# Patient Record
Sex: FEMALE | Race: BLACK OR AFRICAN AMERICAN
[De-identification: names, ages, dates, MRNs, and addresses within clinical notes are randomized per-mention and may not be internally consistent; named-entity substitution may affect disease eponyms.]

---

## 2017-02-02 ENCOUNTER — HOSPITAL ENCOUNTER (OUTPATIENT)
Dept: HOSPITAL 62 - LAB | Age: 22
End: 2017-02-02
Attending: NURSE PRACTITIONER
Payer: COMMERCIAL

## 2017-02-02 DIAGNOSIS — N89.8: Primary | ICD-10-CM

## 2017-02-02 LAB — CHLAM PCR: NOT DETECTED

## 2017-02-02 PROCEDURE — 87210 SMEAR WET MOUNT SALINE/INK: CPT

## 2017-02-02 PROCEDURE — 87491 CHLMYD TRACH DNA AMP PROBE: CPT

## 2017-02-02 PROCEDURE — 87591 N.GONORRHOEAE DNA AMP PROB: CPT

## 2017-10-09 ENCOUNTER — HOSPITAL ENCOUNTER (EMERGENCY)
Dept: HOSPITAL 62 - ER | Age: 22
Discharge: HOME | End: 2017-10-09
Payer: COMMERCIAL

## 2017-10-09 VITALS — SYSTOLIC BLOOD PRESSURE: 136 MMHG | DIASTOLIC BLOOD PRESSURE: 89 MMHG
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
## 2017-10-09 DIAGNOSIS — S89.91XA: Primary | ICD-10-CM

## 2017-10-09 DIAGNOSIS — M25.561: ICD-10-CM

## 2017-10-09 PROCEDURE — 99283 EMERGENCY DEPT VISIT LOW MDM: CPT

## 2017-10-09 NOTE — RADIOLOGY REPORT (SQ)
EXAM DESCRIPTION:  KNEE LEFT 3 VIEWS



COMPLETED DATE/TIME:  10/9/2017 4:50 pm



REASON FOR STUDY:  knee pain from fall



COMPARISON:  None.



NUMBER OF VIEWS:  Three views.



TECHNIQUE:  AP, lateral, and sunrise patella radiographic images acquired of the left knee.



LIMITATIONS:  None.



FINDINGS:  MINERALIZATION: Normal.

BONES: No dislocation.  Mild undulation in the medial femoral condylar articular surface without scle
rosis, probable osteochondral injury.

JOINT: No effusion.

SOFT TISSUES: No soft tissue swelling.  No radio-opaque foreign body.

OTHER: No other significant finding.



IMPRESSION:  Moderate joint effusion.   Mild undulation in the medial femoral condylar articular surf
ace without sclerosis, probable osteochondral injury.



TECHNICAL DOCUMENTATION:  JOB ID:  [id number]

 2011 [employer]- All Rights Reserved

## 2017-10-09 NOTE — ER DOCUMENT REPORT
HPI





- HPI


Patient complains to provider of: right knee pain


Pain Level: 4


Context: 





Patient is a 22-year-old female who states that her left knee gave out while 

she was getting groceries out of the car today.  She states she has pain on it 

whenever she tries to stand on it.  Otherwise she states she is able to bend it 

and straighten it when she is laying flat.  She has not taken anything for pain 

prior.





- DERM


Skin Color: Normal, Pink





Past Medical History





- Social History


Smoking Status: Unknown if Ever Smoked


Family History: Reviewed & Not Pertinent


Patient has suicidal ideation: No


Patient has homicidal ideation: No


Renal/ Medical History: Denies: Hx Peritoneal Dialysis





Vertical Provider Document





- CONSTITUTIONAL


Agree With Documented VS: Yes


Exam Limitations: No Limitations


General Appearance: WD/WN, No Apparent Distress


Notes: 





PHYSICAL EXAM


GENERAL: Alert, interacts well. 


HEAD: Normocephalic, atraumatic.


EXTREMITIES: Left knee with negative drawer testing, valgus/varus. no joint 

line tenderness. moves all 4 extremities spontaneously. No edema, radial and 

dorsalis pedis pulses 2/4 bilaterally. No cyanosis. 


NEUROLOGICAL: Alert and oriented x4. Normal speech.


PSYCH: Normal affect, normal mood.


SKIN: Warm, dry, normal turgor. No rashes or lesions noted.








- INFECTION CONTROL


TRAVEL OUTSIDE OF THE [place] IN LAST 30 DAYS: No





- RESPIRATORY


O2 Sat by Pulse Oximetry: 99





Course





- Re-evaluation


Re-evalutation: 





10/09/17 17:56


Patient is a 22-year-old female hemodynamically stable, no acute distress 

afebrile no evidence of a septic joint, gout flare, dislocation, or fracture on 

exam and imaging.  There is evidence of moderate joint effusion but patient 

declining joint aspiration at this time and bending and flexing the knee 

without any difficulty or pain when not standing on it.  Vitals wnl. At this 

time, I do not see an indication for labs or further imaging. Will discharge 

with conservative measures, return precautions, and follow-up recommendations.





- Vital Signs


Vital signs: 


 











Temp Pulse Resp BP Pulse Ox


 


 98.2 F   111 H  18   151/91 H  99 


 


 10/09/17 15:37  10/09/17 15:37  10/09/17 15:37  10/09/17 15:37  10/09/17 15:37














Discharge





- Discharge


Clinical Impression: 


 Knee injury





Condition: Good


Disposition: HOME, SELF-CARE


Instructions:  Use of Crutches ([facility]), Ice & Elevation ([facility]), Sprained Knee ([facility])

, Suspected Internal Knee Injury ([facility])


Additional Instructions: 


If symptoms do not improve in approximately 10 days please follow-up with 

primary care doctor listed below.


Prescriptions: 


Ibuprofen [Motrin 800 mg Tablet] 800 mg PO Q8H PRN #30 tab


 PRN Reason: 


Forms:  Special Work Note, Elevated Blood Pressure


Referrals: 


ISIDRA MEJIA MD [COMMUNITY BASED STAFF] - Follow up as needed

## 2017-10-19 ENCOUNTER — HOSPITAL ENCOUNTER (OUTPATIENT)
Dept: HOSPITAL 62 - RAD | Age: 22
End: 2017-10-19
Attending: FAMILY MEDICINE
Payer: COMMERCIAL
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
## 2017-10-19 DIAGNOSIS — S83.512A: Primary | ICD-10-CM
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
## 2017-10-20 NOTE — RADIOLOGY REPORT (SQ)
EXAM DESCRIPTION:  MRI LT LOWER JOINT WITHOUT



COMPLETED DATE/TIME:  10/19/2017 5:39 pm



REASON FOR STUDY:  SPRAIN OF ANTERIOR CRUCIATE LIGAMENT OF LEFT KNEE, INITIAL ENCOUNTER S43.52XA  SPR
AIN OF LEFT ACROMIOCLAVICULAR JOINT, INITIAL ENC



COMPARISON:  Radiographs 10/9/2017.



TECHNIQUE:  Leftknee images acquired and stored on PACS.  Multiplanar images include fat sensitive se
quences as T1, water sensitive sequences as FST2 or STIR, cartilage sensitive sequences as FSPD, and 
gradient echo sequences.



LIMITATIONS:  None.



FINDINGS:  JOINT AND BURSAE: Moderate to large joint effusion.  Potential dependent debris in the asa
nt.

BONE CORTEX AND MARROW: Contusion tracking to subchondral bone in the lateral femoral condyle, mild i
mpaction deformity here.  Contusion also tracks to the posterior aspect of the lateral tibial plateau
.

ACL: Completely disrupted.  Minimal proximal and distal fibers seen, but the majority of the region o
f the ligament is replaced by edema.

PCL: Intact.

MCL: Ligament intact but regional superficial edema.  Likely grade 1 sprain.

LCL: Intact.

MEDIAL MENISCUS: No tears. No abnormal signal.

LATERAL MENISCUS: No tears. No abnormal signal.

MEDIAL COMPARTMENT: Cartilage preserved. No bone bruises or reactive marrow edema. No osteophytes.

LATERAL COMPARTMENT: As above.  No discrete chondral defects.

PATELLA: No chondromalacia. No subchondral cysts. Medial and lateral retinacula intact.

EXTENSOR MECHANISM: Generally intact patellar and quadriceps tendons.  Minimal distal patellar tendin
osis with some associated enthesopathy suggested.

SOFT TISSUES: Adjacent muscles and subcutaneous tissues normal.  Normal flow void in popliteal artery
 and vein.

OTHER: No other significant finding.



IMPRESSION:  1. Complete ACL disruption with associated lateral compartment contusions and mild impac
tion fracture in the lateral femoral condyle.  2. Low-grade MCL sprain.  3.  PCL, lateral collateral 
ligament complex and menisci look intact.



TECHNICAL DOCUMENTATION:  JOB ID:  [id number]

 2011 [employer]- All Rights Reserved

## 2017-10-26 ENCOUNTER — HOSPITAL ENCOUNTER (EMERGENCY)
Dept: HOSPITAL 62 - ER | Age: 22
Discharge: HOME | End: 2017-10-26
Payer: COMMERCIAL

## 2017-10-26 VITALS — DIASTOLIC BLOOD PRESSURE: 91 MMHG | SYSTOLIC BLOOD PRESSURE: 139 MMHG

## 2017-10-26 DIAGNOSIS — M79.1: ICD-10-CM

## 2017-10-26 DIAGNOSIS — Z88.0: ICD-10-CM

## 2017-10-26 DIAGNOSIS — M79.605: Primary | ICD-10-CM

## 2017-10-26 DIAGNOSIS — M25.569: ICD-10-CM

## 2017-10-26 DIAGNOSIS — M79.602: ICD-10-CM

## 2017-10-26 DIAGNOSIS — M53.3: ICD-10-CM
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
## 2017-10-26 PROCEDURE — 72220 X-RAY EXAM SACRUM TAILBONE: CPT

## 2017-10-26 PROCEDURE — 99284 EMERGENCY DEPT VISIT MOD MDM: CPT
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
## 2017-11-15 ENCOUNTER — HOSPITAL ENCOUNTER (EMERGENCY)
Dept: HOSPITAL 62 - ER | Age: 22
Discharge: HOME | End: 2017-11-15
Payer: COMMERCIAL

## 2017-11-15 VITALS — SYSTOLIC BLOOD PRESSURE: 143 MMHG | DIASTOLIC BLOOD PRESSURE: 84 MMHG
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
## 2017-11-15 DIAGNOSIS — M25.562: ICD-10-CM

## 2017-11-15 DIAGNOSIS — R03.0: ICD-10-CM

## 2017-11-15 DIAGNOSIS — S83.92XA: Primary | ICD-10-CM

## 2017-11-15 PROCEDURE — 99283 EMERGENCY DEPT VISIT LOW MDM: CPT
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
## 2018-07-22 NOTE — ER DOCUMENT REPORT
ED General





- General


Chief Complaint: Back Pain


Stated Complaint: BACK PAIN


Time Seen by Provider: 07/22/18 17:26


Mode of Arrival: Ambulatory


Information source: Patient


Notes: 





Patient is an otherwise healthy 23-year-old female who presents with low back 

pain.  Patient reports that on Friday she was at work and lifted a patient, 

patient reports the next day she started having low back pain.  Patient denies 

any radiation of the pain, denies any numbness or tingling.  Patient further 

denies any urinary symptoms or fever.


TRAVEL OUTSIDE OF THE [place] IN LAST 30 DAYS: No





- Related Data


Allergies/Adverse Reactions: 


 





Penicillins Allergy (Verified 11/15/17 17:11)


 











Past Medical History





- General


Information source: Patient





- Social History


Smoking Status: Never Smoker


Chew tobacco use (# tins/day): No


Drug Abuse: None


Lives with: Family


Family History: Reviewed & Not Pertinent


Patient has suicidal ideation: No


Patient has homicidal ideation: No





- Medical History


Medical History: Negative


Renal/ Medical History: Denies: Hx Peritoneal Dialysis


Surgical Hx: Negative





- Immunizations


Immunizations up to date: Yes





Review of Systems





- Review of Systems


Constitutional: No symptoms reported


EENT: No symptoms reported


Cardiovascular: No symptoms reported


Respiratory: No symptoms reported


Gastrointestinal: No symptoms reported


Genitourinary: No symptoms reported


Female Genitourinary: No symptoms reported


Musculoskeletal: See HPI


Skin: No symptoms reported


Hematologic/Lymphatic: No symptoms reported


Neurological/Psychological: No symptoms reported





Physical Exam





- Vital signs


Vitals: 


 











Temp Pulse Resp BP Pulse Ox


 


 99.5 F   98   18   124/81   98 


 


 07/22/18 16:23  07/22/18 16:23  07/22/18 16:23  07/22/18 16:23  07/22/18 16:23














- Notes


Notes: 





PHYSICAL EXAMINATION:





GENERAL: Well-appearing, well-nourished and in no acute distress.





HEAD: Atraumatic, normocephalic.





EYES: Pupils equal round and reactive to light, extraocular movements intact, 

conjunctiva are normal.





ENT: Nares patent, oropharynx clear without exudates.  Moist mucous membranes.





NECK: Normal range of motion, supple without lymphadenopathy





LUNGS: Breath sounds clear to auscultation bilaterally and equal.  No wheezes 

rales or rhonchi.





HEART: Regular rate and rhythm without murmurs





ABDOMEN: Soft, nontender, nondistended abdomen.  No guarding, no rebound.  No 

masses appreciated.





Female : deferred





Musculoskeletal: Normal range of motion, no pitting or edema.  No cyanosis.  

Tenderness to palpation to bilateral paraspinous muscles.  No tenderness on 

palpation over the lumbar spine.





NEUROLOGICAL: Cranial nerves grossly intact.  Normal speech, normal gait.  

Normal sensory, motor exams





PSYCH: Normal mood, normal affect.





SKIN: Warm, Dry, normal turgor, no rashes or lesions noted.





Course





- Re-evaluation


Re-evalutation: 


Patient's complaint of low back pain and events surrounding it are consistent 

with musculoskeletal strain.  Will place patient on muscle relaxer and pain 

medication and reevaluate.








Patient reports reduction of pain after administration of IM Toradol and p.o. 

Flexeril.  Patient will be discharged home with instructions for managing a 

lumbar strain.  Patient will be encouraged to follow-up with her primary care 

provider as well as Workmen's Comp. provider for further direction.





- Vital Signs


Vital signs: 


 











Temp Pulse Resp BP Pulse Ox


 


 99 F   88   16   132/82 H  99 


 


 07/22/18 18:40  07/22/18 18:40  07/22/18 18:40  07/22/18 18:40  07/22/18 18:40














Discharge





- Discharge


Clinical Impression: 


 Musculoskeletal strain





Condition: Stable


Disposition: HOME, SELF-CARE


Additional Instructions: 


Muscle Strain





     You have strained a muscle -- torn the fibers within the muscle. This 

often occurs with strenuous exertion, or during an injury that suddenly 

stretches the muscle.  The seriousness of a strain varies. Some strains heal 

within days, others cause problems for months.


     X-rays cannot show a muscle strain.  X-rays are taken only if symptoms 

suggest that a fracture could be present.


     The usual treatment of a muscle strain is rest and ice packs. Sometimes, a 

sling, splint, or crutches may be necessary to rest the muscle.  The muscle can 

be used again once pain subsides.  Severe strains require a special exercise 

and stretching program to prevent permanent stiffness and disability.  Your 

doctor will advise you if this will be necessary.


     Call the doctor immediately if pain or swelling becomes severe, or if 

numbness or discoloration develop.





LOW BACK PAIN:





     Three out of every four people will have an episode of disabling back pain 

during their lifetime.  Most commonly the pain is due to straining of the 

muscles and ligaments in the low back.


     Usual treatment includes: 


(1) Rest on a firm surface.  Avoid lying on your stomach.  


(2) Ice pack the painful area.  After a few days, gentle heat may be used 

intermittently to relax the area, or ice packs can be continued.  


(3) Medication may be needed -- muscle relaxers and antiinflammatory medicines 

are commonly used.  


(4) As the back improves, exercises are prescribed to strengthen the back and 

abdominal muscles.


     Your doctor will advise you on the proper care for your back at each stage 

in your recovery.  You may be better in a few days -- or healing may take 

several weeks.


     If new symptoms of a "herniated disc" (radiation of pain, numbness, or 

tingling down the back of the leg or weakness in the leg) occur, you should be 

re-examined.  Further testing may be necessary.





MUSCLE RELAXERS: 


     Muscle relaxing medications are usually prescribed for acute muscle spasm 

or injury to the neck and back.  They are often combined with antiinflammatory 

pain medication for increased relief.


     You may stop the muscle relaxer when the pain and stiffness have improved.

  Start the medication again if spasms recur.  


     Muscle relaxers may cause drowsiness, especially with the first dose.  Do 

not operate machinery or drive while under the effects of the medication.  Most 

muscle relaxers last up to 24 hours.  Do not combine the medication with 

alcohol.








ICE PACKS:


     Apply ice packs frequently against the painful area.  Many different 

schedules are recommended, such as "20 minutes on, 20 minutes off" or "one hour 

ice, two hours rest."  If you need to work, you may need to go longer between 

ice treatments.  You should plan to have the area ice packed AT LEAST one 

fourth of the time.


     The ice should be applied over the wrap, tape, or splint, or over a layer 

of cloth -- not directly against the skin.  Some ice bags have a built-in cloth 

and can be put directly on the skin.





WARM PACKS:


     After approximately two days, apply gentle heat (such as a heating pad or 

hot water bottle) for about 20 to 30 minutes about every two hours -- at least 

four times daily.  Warmth and elevation will help you make a more rapid recovery

, and will ease the pain considerably.


     Do not use HOT heat, and never apply heat for longer than 30 minutes.  The 

continuous heat can invisibly damage skin and muscles -- even when no burn is 

seen on the surface.  Damaged muscles can make you MORE sore.








FOLLOW-UP CARE:


If you have been referred to a physician for follow-up care, call the physician

s office for an appointment as you were instructed or within the next two days.

  If you experience worsening or a significant change in your symptoms, notify 

the physician immediately or return to the Emergency Department at any time for 

re-evaluation.


Prescriptions: 


Cyclobenzaprine HCl [Flexeril 10 mg Tablet] 10 mg PO TIDP PRN 20 Days #15 tab


 PRN Reason: 


Referrals: 


ANTONIETTA BAUER NP [Primary Care Provider] - Follow up as needed
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
## 2019-02-25 NOTE — ER DOCUMENT REPORT
HPI





- HPI


Patient complains to provider of: L knee injury


Time Seen by Provider: 02/25/19 18:39


Pain Level: 3


Context: 





Pleasant 24-year-old female with history of L ACL repair in 2018 presents with 

left knee pain after closing her car door on it.  Patient states she is able to 

ambulate on it and was not going to come to the emergency department but was 

concerned about the integrity of her ACL repair.  She complains of lateral left 

knee pain, "tightness", has no other complaints





- CONSTITUTIONAL


Constitutional: DENIES: Fever, Chills





- EENT


EENT: DENIES: Sore Throat, Ear Pain, Eye problems





- NEURO


Neurology: DENIES: Headache, Weakness, Vision blurred, Dizzinesss / Vertigo





- CARDIOVASCULAR


Cardiovascular: DENIES: Chest pain





- RESPIRATORY


Respiratory: DENIES: Trouble Breathing, Coughing





- GASTROINTESTINAL


Gastrointestinal: DENIES: Abdominal Pain, Black / Bloody Stools





- URINARY


Urinary: DENIES: Dysuria, Urgency, Frequency





- REPRODUCTIVE


Reproductive: DENIES: Pregnant:





- MUSCULOSKELETAL


Musculoskeletal: REPORTS: Extremity pain - Left knee





Past Medical History





- Social History


Smoking Status: Never Smoker


Frequency of alcohol use: Occasional


Drug Abuse: None


Family History: Reviewed & Not Pertinent


Patient has suicidal ideation: No


Patient has homicidal ideation: No


Renal/ Medical History: Denies: Hx Peritoneal Dialysis


Past Surgical History: Reports: Hx Orthopedic Surgery - left knee





- Immunizations


Immunizations up to date: Yes





[employer] Provider Document





- CONSTITUTIONAL


Notes: 





PHYSICAL EXAMINATION:





Reviewed vital signs and charting by RN





GENERAL: Alert, interacts well. No acute distress.


HEAD: Normocephalic, atraumatic.


EYES: Pupils equal, round. Extraocular movements intact.


EXTREMITIES: Moves all 4 extremities spontaneously.  Mild edema left lateral 

knee with tenderness to palpation over incision site, normal anterior drawer 

test, mild pain elicited with valgus stress otherwise normal knee exam.


BACK: no cervical, thoracic, lumbar midline tenderness. No saddle anesthesia, 

normal distal neurovascular exam. 


NEUROLOGICAL: Alert . Normal speech. 


PSYCH: Normal affect, normal mood.


SKIN: Warm, dry, normal turgor. No rashes or lesions noted.





- INFECTION CONTROL


TRAVEL OUTSIDE OF THE [place] IN LAST 30 DAYS: No





Course





- Re-evaluation


Re-evalutation: 





02/25/19 19:00


X-ray negative for fracture, dislocation and ACL repair seems to be in tact.  

Patient was only concerned about that.  She is ambulating and has crutches and a

brace at home.  I will give her a dose of Tylenol and she is safe and stable for

discharge with follow-up with her orthopedic surgeon.





- Vital Signs


Vital signs: 


                                        











Temp Pulse Resp BP Pulse Ox


 


 99.6 F   98   15   150/88 H  99 


 


 02/25/19 16:40  02/25/19 16:40  02/25/19 16:40  02/25/19 16:40  02/25/19 16:40














Discharge





- Discharge


Clinical Impression: 


Left knee injury


Qualifiers:


 Encounter type: initial encounter Qualified Code(s): S89.92XA - Unspecified 

injury of left lower leg, initial encounter





Condition: Good


Disposition: HOME, SELF-CARE


Instructions:  Ice & Elevation ([facility]), Sprained Knee ([facility])


Additional Instructions: 


You are seen in the emergency department this afternoon for her knee 3.  Her ACL

repair seems to be intact.  There is no fracture or dislocation of the left 

knee.  He most likely suffered from a bruise or some very mild soft tissue 

injury.  Please follow-up with your orthopedic surgeon and use your ice machine 

as needed to help with pain and swelling.  You can take Tylenol 1000 mill grams 

every 6 hours or Motrin 600 mg every 6 hours for pain and inflammation.  If your

knee becomes hot and red or you are unable to move the knee at all please merely

return to the emergency department.  If you develop fever greater than 101 

degrees please merely return to the emergency department.


Referrals: 


ANTONIETTA BAUER NP [Primary Care Provider] - Follow up as needed


SAJAN MONREAL MD [NO LOCAL MD] - Follow up as needed

## 2019-02-25 NOTE — RADIOLOGY REPORT (SQ)
EXAM DESCRIPTION:  KNEE LEFT 4 VIEW



COMPLETED DATE/TIME:  2/25/2019 4:53 pm



REASON FOR STUDY:  Shut L knee in car door/twisted knee.



COMPARISON:  None.



EXAM PARAMETERS:  NUMBER OF VIEWS: Four views.

TECHNIQUE: AP, lateral and 2 oblique  radiographic images acquired of the left knee.

LIMITATIONS: None.



FINDINGS:  MINERALIZATION: Normal.

BONES: No acute fracture or dislocation.  ACL reconstruction hardware appears intact.  .

JOINTS: No effusion.

SOFT TISSUES: No significant soft tissue swelling.  No radiopaque foreign body.

OTHER: No other significant finding.



IMPRESSION:  NO FRACTURE.



TECHNICAL DOCUMENTATION:  JOB ID:  [id number]

[id number]

 2011 [employer]- All Rights Reserved



Reading location - IP/workstation name: [email or web address]

## 2019-06-17 NOTE — ER DOCUMENT REPORT
ED Medical Screen (RME)





- General


Chief Complaint: Abdominal Pain


Stated Complaint: ABDOMINAL PAIN


Time Seen by Provider: 06/17/19 10:09


Primary Care Provider: 


ANTONIETTA BAUER NP [Primary Care Provider] - Follow up as needed


TRAVEL OUTSIDE OF THE [place] IN LAST 30 DAYS: No





- HPI


Notes: 





06/17/19 10:12


Patient is a 24-year-old female with no significant past medical history who 

presents complaining of lower pelvic pain primarily to the middle over the past 

several days that has been relatively constant and does not radiate.  Patient 

states that it almost mimics menstrual cramping, but she has not had any vaginal

bleeding/odor/discharge.  Last menstrual cycle was May 23.  She is eating and 

drinking without difficulties.  She is urinating normally.  Patient states that 

she did have a bowel movement yesterday which was described as soft, but before 

that had been 3 days.  Denies HA, fever, neck pain, URI, CP, SOB, dysuria, back 

pain, or rash.





I have treated and performed a rapid initial assessment of this patient.  A 

comprehensive ED assessment and evaluation of the patient, analysis of test 

results and completion of medical decision making process will be conducted by 

additional ED providers.





PHYSICAL EXAMINATION:





GENERAL: Well-appearing, well-nourished and in no acute distress.  A&Ox4.  

Answers questions appropriately.





LUNGS: Breath sounds clear to auscultation bilaterally and equal.  No wheezes 

rales or rhonchi.





HEART: Regular rate and rhythm without murmurs, rubs, gallops.





ABDOMEN: Soft, nondistended abdomen.  No guarding, no rebound.  Normal bowel 

sounds present.  No CVA tenderness bilaterally.  + lower middle 

suprapubic/pelvic tenderness (cannot elicit thorough abd exam w/o bed, however).








- Related Data


Allergies/Adverse Reactions: 


                                        





Penicillins Allergy (Verified 11/15/17 17:11)


   











Past Medical History


Renal/ Medical History: Denies: Hx Peritoneal Dialysis


Past Surgical History: Reports: Hx Orthopedic Surgery - left knee





- Immunizations


Immunizations up to date: Yes





Physical Exam





- Vital signs


Vitals: 





                                        











Temp Pulse Resp BP Pulse Ox


 


 98.6 F   100   16   142/83 H  99 


 


 06/17/19 10:04  06/17/19 10:04  06/17/19 10:04  06/17/19 10:04  06/17/19 10:04














Course





- Vital Signs


Vital signs: 





                                        











Temp Pulse Resp BP Pulse Ox


 


 98.6 F   100   16   142/83 H  99 


 


 06/17/19 10:04  06/17/19 10:04  06/17/19 10:04  06/17/19 10:04  06/17/19 10:04














Doctor's Discharge





- Discharge


Referrals: 


ANTONIETTA BAUER NP [Primary Care Provider] - Follow up as needed

## 2019-06-17 NOTE — RADIOLOGY REPORT (SQ)
EXAM DESCRIPTION:  U/S NON OB PEL TV W/DOPPLER



COMPLETED DATE/TIME:  6/17/2019 12:12 pm



REASON FOR STUDY:  pelvic pain



COMPARISON:  None.



TECHNIQUE:  Dynamic and static grayscale images acquired of the pelvis via transvaginal approach and 
recorded on PACS. Additional selected color Doppler and spectral images recorded.



LIMITATIONS:  None.



FINDINGS:  UTERUS: Heterogeneous.  Multiple fibroids.  Largest 2 fibroids are 5.5 cm and 5.6 cm, resp
ectively.

ENDOMETRIAL STRIPE: No focal or generalized thickening. No masses.

CERVIX: 2.9 cm.

RIGHT OVARY AND DOPPLER: Normal size. No worrisome masses.  12 mm cyst/follicle.  Normal arterial vas
cular flow without evidence for torsion.

LEFT OVARY AND DOPPLER: Normal size. No worrisome masses. Normal arterial vascular flow without evide
nce for torsion.

FREE FLUID: None noted.

OTHER: No other significant finding.

MEASUREMENTS:

UTERUS: 13.8 x 6.7 x 5.9 cm.

ENDOMETRIAL STRIPE: 1.8 cm.

RIGHT OVARY: 4.1 x 2 x 2.5 cm.

LEFT OVARY: 3.8 x 2.3 x 1.7 cm.



IMPRESSION:  Uterine fibroids.



TECHNICAL DOCUMENTATION:  JOB ID:  [id number]

 2011 [employer]- All Rights Reserved                          Rev-[id number]



Reading location - IP/workstation name: [email or web address]

## 2019-06-17 NOTE — ER DOCUMENT REPORT
ED General





- General


Chief Complaint: Abdominal Pain


Stated Complaint: ABDOMINAL PAIN


Time Seen by Provider: 06/17/19 10:09


Primary Care Provider: 


ANTONIETTA BAUER NP [Primary Care Provider] - Follow up as needed


TRAVEL OUTSIDE OF THE [place] IN LAST 30 DAYS: No





- HPI


Notes: 





Patient is a 24-year-old female that presents to the emergency department for 

chief complaint of pelvic pain.


Patient reports pain in the middle of her pelvis for the last few days.  She 

states it was constant on Saturday and Sunday and became more intermittent last 

night.  Patient did have relief temporarily yesterday after using a suppository 

and having a large bowel movement.  She states that she had not had a bowel 

movement for 4 or 5 days prior to that.  She denies history of severe 

constipation issues in the past.  She states she is now going regularly and has 

a little bit of diarrhea from the suppository and the pain had reoccurred.  She 

denies any vaginal bleeding or discharge.  She denies any vomiting, nausea and 

fevers.  Patient is sexually active and not on birth control.  She had a LMP on 

5/23/2019.  She has not taken a home pregnancy test.  She has no history of 

prior pregnancies and states that she is not sure if she is pregnant today.





Past Medical History: Negative





Past Surgical History: Negative





Social History: Denies drugs alcohol and tobacco





Family History: Reviewed and noncontributory for presenting illness





Allergies: Reviewed, see documented allergy list.








REVIEW OF SYSTEMS:





CONSTITUTIONAL : 





No fever





No chills





No diaphoresis





No recent illness





EENT:





No vision changes





No congestion





No sore throat  





CARDIOVASCULAR:





No chest pain





No palpitations





RESPIRATORY:





No shortness of breath





No cough





No difficulty breathing





GASTROINTESTINAL: 





No abdominal pain





No nausea





No vomiting





No diarrhea





GENITOURINARY:





Pelvic pain





No dysuria





No hematuria





No difficulty urinating





MUSCULOSKELETAL:





No back pain





No leg pain





No arm pain





SKIN:  





No rashes





No lesions





LYMPHATIC: 





No swollen, enlarged glands.





NEUROLOGICAL: 





No lightheadedness





No headache





No weakness





No paresthesias





PSYCHIATRIC:





No anxiety





No depression 








PHYSICAL EXAMINATION:





Vital signs reviewed, nursing noted reviewed.





GENERAL: Well-appearing, well-nourished and in no acute distress.





HEAD: Atraumatic, normocephalic.





EYES: Eyes appear normal, extraocular movements intact, sclera anicteric, 

conjunctiva are normal.





ENT: nares patent, oropharynx clear without exudates.  Moist mucous membranes.





NECK: Normal range of motion, supple without lymphadenopathy





LUNGS: Breath sounds clear to auscultation bilaterally and equal.  No wheezes 

rales or rhonchi.





HEART: Regular rate and rhythm without murmurs





ABDOMEN: Soft, suprapubic tenderness, normoactive bowel sounds.  No rebound, 

guarding, or rigidity. No masses appreciated.





: no external vaginal lesions.  No cervical motion tenderness.  No adnexal 

tenderness or fullness.  Mild tenderness to palpation of the uterus which is 

soft and midline.  Moderate white vaginal discharge





EXTREMITIES: Nontender, good range of motion, no pitting or edema. 





NEUROLOGICAL: No focal neurological deficits. Moves all extremities 

spontaneously Motor and sensory grossly intact on exam.





PSYCH: Normal mood, normal affect.





SKIN: Warm, Dry, normal turgor, no rashes or lesions noted on exposed skin











- Related Data


Allergies/Adverse Reactions: 


                                        





Penicillins Allergy (Verified 11/15/17 17:11)


   











Past Medical History





- Social History


Smoking Status: Never Smoker


Frequency of alcohol use: None


Drug Abuse: None


Family History: Reviewed & Not Pertinent


Patient has suicidal ideation: No


Patient has homicidal ideation: No


Renal/ Medical History: Denies: Hx Peritoneal Dialysis


Past Surgical History: Reports: Hx Orthopedic Surgery - left knee





- Immunizations


Immunizations up to date: Yes





Physical Exam





- Vital signs


Vitals: 


                                        











Temp Pulse Resp BP Pulse Ox


 


 98.6 F   100   16   142/83 H  99 


 


 06/17/19 10:04  06/17/19 10:04  06/17/19 10:04  06/17/19 10:04  06/17/19 10:04














Course





- Re-evaluation


Re-evalutation: 





06/17/19 10:42


Vitals reviewed.  Nursing notes reviewed.  Patient is well-appearing and in no 

acute distress.  I did offer her Tylenol or Motrin for her pain which she has 

declined stating that she does not feel she is requiring pain medication c

urrently.  Pelvic exam was performed and her tenderness is located over her 

uterus.  She does have some vaginal discharge and cultures have been obtained.


06/17/19 12:46


Patient is negative for gonorrhea and chlamydia.  She does have bacterial 

vaginitis which will be treated with Flagyl.  She is not currently pregnant.  

Ultrasound shows uterine fibroids without ovarian torsion or tubo-ovarian absc

ess.  Patient was anemic on blood work with no current active bleeding.  She is 

not tachycardic or having near syncopal spells.  I suspect her anemia is chronic

and not acute.  She was told to follow with primary care for further studies 

regarding her anemia.  Patient was referred to gynecology for follow-up as well.

 She is stable at discharge.





Laboratory











  06/17/19 06/17/19 06/17/19





  10:00 10:25 10:25


 


WBC   8.8 


 


RBC   4.29 


 


Hgb   9.7 L 


 


Hct   30.6 L 


 


MCV   71 L 


 


MCH   22.6 L 


 


MCHC   31.6 L 


 


RDW   17.1 H 


 


Plt Count   566 H 


 


Seg Neutrophils %   68.7 


 


Lymphocytes %   22.0 


 


Monocytes %   7.6 


 


Eosinophils %   0.6 


 


Basophils %   1.1 


 


Absolute Neutrophils   6.1 


 


Absolute Lymphocytes   1.9 


 


Absolute Monocytes   0.7 


 


Absolute Eosinophils   0.1 


 


Absolute Basophils   0.1 


 


Sodium    137.6


 


Potassium    4.7


 


Chloride    103


 


Carbon Dioxide    25


 


Anion Gap    10


 


BUN    12


 


Creatinine    0.68


 


Est GFR ( Amer)    > 60


 


Est GFR (Non-Af Amer)    > 60


 


Glucose    94


 


Calcium    9.1


 


Total Bilirubin    0.5


 


Direct Bilirubin    0.2


 


Neonat Total Bilirubin    Not Reportable


 


Neonat Direct Bilirubin    Not Reportable


 


Neonat Indirect Bili    Not Reportable


 


AST    25


 


ALT    31


 


Alkaline Phosphatase    88


 


Total Protein    7.3


 


Albumin    3.9


 


Urine Color  YELLOW  


 


Urine Appearance  CLOUDY  


 


Urine pH  5.0  


 


Ur Specific Gravity  1.031  


 


Urine Protein  30 H  


 


Urine Glucose (UA)  NEGATIVE  


 


Urine Ketones  NEGATIVE  


 


Urine Blood  NEGATIVE  


 


Urine Nitrite  NEGATIVE  


 


Urine Bilirubin  NEGATIVE  


 


Urine Urobilinogen  2.0 H  


 


Ur Leukocyte Esterase  TRACE H  


 


Urine WBC (Auto)  2  


 


Urine RBC (Auto)  3  


 


Urine Bacteria (Auto)  TRACE  


 


Squamous Epi Cells Auto  48  


 


Urine Mucus (Auto)  MOD  


 


Urine Ascorbic Acid  NEGATIVE  


 


Urine HCG, Qual  NEGATIVE  


 


Epi Cells (Wet Prep)   


 


Bacteria (Wet Prep)   


 


Trichomonas (Wet Prep)   


 


Vaginal WBC   


 


Vaginal RBC   


 


Vaginal Yeast   


 


Chlamydia DNA (PCR)   


 


N.gonorrhoeae DNA (PCR)   














  06/17/19 06/17/19





  10:40 10:40


 


WBC  


 


RBC  


 


Hgb  


 


Hct  


 


MCV  


 


MCH  


 


MCHC  


 


RDW  


 


Plt Count  


 


Seg Neutrophils %  


 


Lymphocytes %  


 


Monocytes %  


 


Eosinophils %  


 


Basophils %  


 


Absolute Neutrophils  


 


Absolute Lymphocytes  


 


Absolute Monocytes  


 


Absolute Eosinophils  


 


Absolute Basophils  


 


Sodium  


 


Potassium  


 


Chloride  


 


Carbon Dioxide  


 


Anion Gap  


 


BUN  


 


Creatinine  


 


Est GFR ( Amer)  


 


Est GFR (Non-Af Amer)  


 


Glucose  


 


Calcium  


 


Total Bilirubin  


 


Direct Bilirubin  


 


Neonat Total Bilirubin  


 


Neonat Direct Bilirubin  


 


Neonat Indirect Bili  


 


AST  


 


ALT  


 


Alkaline Phosphatase  


 


Total Protein  


 


Albumin  


 


Urine Color  


 


Urine Appearance  


 


Urine pH  


 


Ur Specific Gravity  


 


Urine Protein  


 


Urine Glucose (UA)  


 


Urine Ketones  


 


Urine Blood  


 


Urine Nitrite  


 


Urine Bilirubin  


 


Urine Urobilinogen  


 


Ur Leukocyte Esterase  


 


Urine WBC (Auto)  


 


Urine RBC (Auto)  


 


Urine Bacteria (Auto)  


 


Squamous Epi Cells Auto  


 


Urine Mucus (Auto)  


 


Urine Ascorbic Acid  


 


Urine HCG, Qual  


 


Epi Cells (Wet Prep)   4+ EPITHELIALS SEEN


 


Bacteria (Wet Prep)   4+ BACTERIA SEEN


 


Trichomonas (Wet Prep)   NO TRICHOMONAS SEEN


 


Vaginal WBC   2+ WBCS SEEN


 


Vaginal RBC   FEW RBCS SEEN


 


Vaginal Yeast   NO YEAST SEEN


 


Chlamydia DNA (PCR)  NOT DETECTED 


 


N.gonorrhoeae DNA (PCR)  NOT DETECTED 











                                        





Transvaginal US  06/17/19 10:14


IMPRESSION:  Uterine fibroids.


 














- Vital Signs


Vital signs: 


                                        











Temp Pulse Resp BP Pulse Ox


 


 98.6 F   100   16   142/83 H  99 


 


 06/17/19 10:04  06/17/19 10:04  06/17/19 10:04  06/17/19 10:04  06/17/19 10:04














- Laboratory


Result Diagrams: 


                                 06/17/19 10:25





                                 06/17/19 10:25


Laboratory results interpreted by me: 


                                        











  06/17/19 06/17/19





  10:00 10:25


 


Hgb   9.7 L


 


Hct   30.6 L


 


MCV   71 L


 


MCH   22.6 L


 


MCHC   31.6 L


 


RDW   17.1 H


 


Plt Count   566 H


 


Urine Protein  30 H 


 


Urine Urobilinogen  2.0 H 


 


Ur Leukocyte Esterase  TRACE H 














Discharge





- Discharge


Clinical Impression: 


 Bacterial vaginitis, Elevated blood pressure reading





Anemia


Qualifiers:


 Anemia type: unspecified type Qualified Code(s): D64.9 - Anemia, unspecified





Uterine fibroid


Qualifiers:


 Uterine leiomyoma location: unspecified location Qualified Code(s): D25.9 - 

Leiomyoma of uterus, unspecified





Condition: Stable


Disposition: HOME, SELF-CARE


Instructions:  Anemia ([facility]), Vaginosis, Bacterial ([facility])


Additional Instructions: 


Please return to the emergency department if you have any worsening, or concern 

of your symptoms.





Please return to the emergency department if you develop chest pain, difficulty 

breathing, severe abdominal pain, or ongoing vomiting.





Please follow-up with your primary care physician in 2-3 days and any other 

recommended physicians.





If prescribed, take all medications as directed. 





If you have any questions or concerns do not hesitate to return the emergency 

department for evaluation.





Your blood pressure was elevated today, you need to have it rechecked at a 

primary care doctor's office to confirm elevated blood pressure and to evaluate 

whether you need to be on blood pressure medication.





You were anemic today and are requiring further studies to check your iron 

levels and causes of anemia. 





There were uterine fibroids seen on your ultrasound today.  This may be causing 

discomfort in your pelvic region.  They can cause increased amounts of bleeding 

during menstrual cycles.  They are benign but should be followed with OB/GYN





Prescriptions: 


Metronidazole [Flagyl 500 mg Tablet] 500 mg PO BID #14 tablet


Forms:  Elevated Blood Pressure


Referrals: 


[facility] [Provider Group] - Follow up in 3-5 days


ANTONIETTA BAUER NP [Primary Care Provider] - Follow up in 3-5 days

## 2020-02-18 NOTE — ER DOCUMENT REPORT
HPI





- HPI


Time Seen by Provider: 02/18/20 18:16


Pain Level: 4


Context: 





Patient is a 24-year-old female presents emergency department with a chief 

complaint of cough and congestion.  Patient reports that Friday after getting 

into a wet car she developed postnasal drip and runny nose.  Patient denies 

fever.  Denies chills.  Patient reports she did take DayQuil which did help with

her symptoms.  Patient reports this was worse after a rainy day and climate 

change.  Denies sick contacts.





- CONSTITUTIONAL


Constitutional: DENIES: Fever, Chills





- EENT


EENT: REPORTS: Ear Pain.  DENIES: Sore Throat, Eye problems





- NEURO


Neurology: DENIES: Headache





- CARDIOVASCULAR


Cardiovascular: DENIES: Chest pain





- RESPIRATORY


Respiratory: REPORTS: Coughing.  DENIES: Trouble Breathing





- GASTROINTESTINAL


Gastrointestinal: DENIES: Abdominal Pain





- REPRODUCTIVE


LMP: 1/25/2020


Reproductive: DENIES: Pregnant:





Past Medical History





- General


Information source: Patient





- Social History


Smoking Status: Never Smoker


Chew tobacco use (# tins/day): No


Frequency of alcohol use: Occasional


Drug Abuse: None


Lives with: Family


Family History: Reviewed & Not Pertinent


Patient has suicidal ideation: No


Patient has homicidal ideation: No





- Past Medical History


Cardiac Medical History: Reports: None


Pulmonary Medical History: Reports: None


EENT Medical History: Reports: None


Neurological Medical History: Reports: None


Endocrine Medical History: Reports: None


Renal/ Medical History: Reports: None.  Denies: Hx Peritoneal Dialysis


Malignancy Medical History: Reports: None


GI Medical History: Reports: None


Musculoskeletal Medical History: Reports None


Skin Medical History: Reports None


Psychiatric Medical History: Reports: None


Traumatic Medical History: Reports: None


Infectious Medical History: Reports: None


Past Surgical History: Reports: Hx Orthopedic Surgery - left knee ACL repair





- Immunizations


Immunizations up to date: Yes





Vertical Provider Document





- CONSTITUTIONAL


Agree With Documented VS: Yes


Exam Limitations: No Limitations


General Appearance: No Apparent Distress


Notes: 





GENERAL: Well-appearing, well-nourished and in no acute distress.


HEAD: Atraumatic, normocephalic.


EYES: Pupils equal round and reactive to light, extraocular movements intact, 

sclera anicteric, conjunctiva are normal.


ENT: TMs normal, nares patent, oropharynx clear without exudates.  Moist mucous 

membranes.  Bilateral erythematous turbinates bilaterally.  Clear rhinorrhea.


NECK: Normal range of motion, supple without lymphadenopathy or JVD.


LUNGS: Breath sounds clear to auscultation bilaterally and equal.  No wheezes 

rales or rhonchi.


HEART: Regular rate and rhythm without murmurs, rubs or gallops.


ABDOMEN: Soft, nontender, normoactive bowel sounds.  No guarding, no rebound.  

No masses appreciated.


BACK: No cervical, thoracic, lumbar midline tenderness.  No saddle anesthesia, 

normal distal neurovascular exam.


GENITOURINARY: Deferred.


EXTREMITIES: Normal range of motion, no pitting or edema.  No clubbing or 

cyanosis.


NEUROLOGICAL: Cranial nerves II through XII grossly intact.  Normal speech, 

normal gait.


PSYCH: Normal mood, normal affect.


SKIN: Warm, Dry, normal turgor, no rashes or lesions noted.





- INFECTION CONTROL


TRAVEL OUTSIDE OF THE [place] IN LAST 30 DAYS: No





Course





- Re-evaluation


Re-evalutation: 





02/18/20 18:27


At time of discharge patient is not tachycardic, febrile or hypotensive.  

Symptoms consistent with seasonal allergies.  Will initiate Flonase as well as 

Zyrtec.  Patient verbalizes understanding denies questions at this time.





- Vital Signs


Vital signs: 


                                        











Temp Pulse Resp BP Pulse Ox


 


 98.8 F   85   16   125/75   99 


 


 02/18/20 16:46  02/18/20 16:46  02/18/20 16:46  02/18/20 16:46  02/18/20 16:46














Discharge





- Discharge


Clinical Impression: 


 Seasonal allergies, Rhinorrhea, Postnasal drip, Sneezing





Condition: Stable


Disposition: HOME, SELF-CARE


Additional Instructions: 


Today was seen emergency department for sneezing, runny nose, postnasal drip.  

Your physical examination was reassuring I do not believe that you require 

additional testing at this time.  I will prescribe you Flonase.  Will use 

Flonase 2 sprays in each nostril daily.  Please also incorporate Zyrtec.  Zyrtec

is antihistamine.  Take this once daily for the next month to see if this helps 

with your symptoms as her symptoms are consistent with seasonal allergies.





Please return to the emergency department if your symptoms worsen or change.


Prescriptions: 


Fluticasone Propionate [Flonase Nasal Spray 50 Mcg/Spray 16 gm] 2 spray NASL 

DAILY #1 inhaler


Cetirizine HCl [Zyrtec 10 mg Tablet] 10 mg PO DAILY 2 Days #30 tablet


Referrals: 


ANTONIETTA BAUER NP [Primary Care Provider] - Follow up as needed
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
